# Patient Record
Sex: MALE | Race: BLACK OR AFRICAN AMERICAN | NOT HISPANIC OR LATINO | Employment: OTHER | URBAN - METROPOLITAN AREA
[De-identification: names, ages, dates, MRNs, and addresses within clinical notes are randomized per-mention and may not be internally consistent; named-entity substitution may affect disease eponyms.]

---

## 2018-03-31 ENCOUNTER — HOSPITAL ENCOUNTER (EMERGENCY)
Facility: HOSPITAL | Age: 43
Discharge: HOME | End: 2018-03-31
Attending: EMERGENCY MEDICINE
Payer: MEDICAID

## 2018-03-31 VITALS
OXYGEN SATURATION: 98 % | BODY MASS INDEX: 6.01 KG/M2 | TEMPERATURE: 100 F | RESPIRATION RATE: 20 BRPM | HEIGHT: 60 IN | WEIGHT: 30.63 LBS | HEART RATE: 135 BPM

## 2018-03-31 DIAGNOSIS — J06.9 VIRAL URI: ICD-10-CM

## 2018-03-31 DIAGNOSIS — H10.31 ACUTE BACTERIAL CONJUNCTIVITIS OF RIGHT EYE: Primary | ICD-10-CM

## 2018-03-31 LAB
FLUAV AG SPEC QL IA: NEGATIVE
FLUBV AG SPEC QL IA: NEGATIVE
RSV AG SPEC QL IA: NEGATIVE
SPECIMEN SOURCE: NORMAL
SPECIMEN SOURCE: NORMAL

## 2018-03-31 PROCEDURE — 87807 RSV ASSAY W/OPTIC: CPT

## 2018-03-31 PROCEDURE — 99283 EMERGENCY DEPT VISIT LOW MDM: CPT

## 2018-03-31 PROCEDURE — 87400 INFLUENZA A/B EACH AG IA: CPT | Mod: 59

## 2018-03-31 RX ORDER — ERYTHROMYCIN 5 MG/G
OINTMENT OPHTHALMIC
Qty: 1 TUBE | Refills: 0 | Status: SHIPPED | OUTPATIENT
Start: 2018-03-31

## 2018-04-01 NOTE — ED PROVIDER NOTES
Encounter Date: 3/31/2018    SCRIBE #1 NOTE: I, Antwan Michelle, am scribing for, and in the presence of, Jennifer Gu NP.       History     Chief Complaint   Patient presents with    Fever     Last tylenol 30 min PTA.       03/31/2018 8:20 PM     Chief complaint: Fever      Mundo Sethi is a 4 y.o. male without any significant PMHx who presents to the ED accompanied by his mother c/o a fever. His mother states he began to run a fever yesterday and has been as high as 102.5. She reports associated symptoms of runny nose and drainage from eye. She denies him having any cough, redness in his eyes, and diarrhea. She has given him tylenol and Zyrtec for his symptoms. He is UTD on his immunizations. She reports he has been drinking water normally but eating less. He has NKDA.           Review of patient's allergies indicates:  No Known Allergies  History reviewed. No pertinent past medical history.  History reviewed. No pertinent surgical history.  History reviewed. No pertinent family history.  Social History   Substance Use Topics    Smoking status: Never Smoker    Smokeless tobacco: Never Used    Alcohol use Not on file     Review of Systems   Constitutional: Positive for fever (102.5 reported, began yesterday). Negative for activity change, appetite change and chills.   HENT: Positive for rhinorrhea. Negative for congestion and sore throat.    Eyes: Positive for discharge. Negative for redness.   Respiratory: Negative for cough and wheezing.    Cardiovascular: Negative for chest pain.   Gastrointestinal: Negative for abdominal pain, diarrhea, nausea and vomiting.   Genitourinary: Negative for decreased urine volume and dysuria.   Musculoskeletal: Negative for neck pain and neck stiffness.   Skin: Negative for rash.   Neurological: Negative for seizures and headaches.       Physical Exam     Initial Vitals [03/31/18 1942]   BP Pulse Resp Temp SpO2   -- (!) 135 20 99.7 °F (37.6 °C) 98 %      MAP       --          Physical Exam    Nursing note and vitals reviewed.  Constitutional: Vital signs are normal. He appears well-developed and well-nourished. He is active and cooperative.  Non-toxic appearance. He does not have a sickly appearance.   HENT:   Head: Normocephalic.   Right Ear: Tympanic membrane, pinna and canal normal. No middle ear effusion.   Left Ear: Tympanic membrane, pinna and canal normal.  No middle ear effusion.   Nose: Rhinorrhea present.   Mouth/Throat: Mucous membranes are moist. Oropharynx is clear.   Eyes: EOM and lids are normal. Visual tracking is normal. Pupils are equal, round, and reactive to light. Right conjunctiva is not injected. Left conjunctiva is not injected. No periorbital edema, tenderness, erythema or ecchymosis on the right side. No periorbital edema, tenderness, erythema or ecchymosis on the left side.   Right eye clear drainage. Not injected. No periorbital edema or erythema.   Neck: Full passive range of motion without pain. Neck supple.   Cardiovascular: Normal rate, regular rhythm and normal heart sounds. Exam reveals no gallop and no friction rub.    No murmur heard.  Pulmonary/Chest: Effort normal and breath sounds normal. No stridor. He has no decreased breath sounds. He has no wheezes. He has no rhonchi. He has no rales.   Abdominal: Soft. Bowel sounds are normal. There is no tenderness. There is no rigidity and no rebound.   Genitourinary: Testes normal and penis normal. Circumcised.   Neurological: He is alert and oriented for age.   Skin: Skin is warm and dry. No rash noted.         ED Course   Procedures  Labs Reviewed   INFLUENZA A AND B ANTIGEN   RSV ANTIGEN DETECTION             Medical Decision Making:   History:   Old Medical Records: I decided to obtain old medical records.  Differential Diagnosis:   Viral URI  Conjunctivitis  Allergic rhinitis  sinusitis    Clinical Tests:   Lab Tests: Ordered and Reviewed       APC / Resident Notes:   Patient is a 4 y.o. male who  presents to the ED 04/01/2018 who underwent emergent evaluation for 1 day of fever and rhinorrhea and right eye drainage.  Patient is alert, laughing, and acting his age. Bi breath sounds clear without wheezing, rhonchi, tachypnea.  Mother that the patient has had a cough.  Do not think pneumonia.  Not feel patient needs chest x-ray at this time.  RSV and influenza testing are negative in ED. do not think RSV or influenza; mother also complains of drainage from right eye.  PERRLA.  No conjunctival hemorrhage or injection. Small amount of green crusting on right eye lashes.   However mother states she's been treating patient with an antibiotic eyedrop she is unsure which one as it was not recently prescribed to him.  We'll give patient proper prescription for erythromycin ointment for probable bacterial conjunctivitis. The patient appears to have a viral upper respiratory infection.  Based upon the history and physical exam the patient does not appear to have a serious bacterial infection such as pneumonia, sepsis, otitis media, bacterial sinusitis, strep pharyngitis, parapharyngeal or peritonsillar abscess, meningitis.  Patient appears very well and I have given specific return precautions to the patient and/or family members.  The patient can take over the counter medications and does not appear to need antibiotics at this time.               Scribe Attestation:   Scribe #1: I performed the above scribed service and the documentation accurately describes the services I performed. I attest to the accuracy of the note.    Attending Attestation:     Physician Attestation Statement for NP/PA:   I discussed this assessment and plan of this patient with the NP/PA, but I did not personally examine the patient. The face to face encounter was performed by the NP/PA.    Other NP/PA Attestation Additions:    History of Present Illness: 4-year-old male presented with a chief complaint of a fever.    Medical Decision Making:  Initial differential diagnosis included but not limited to viral illness, upper respiratory infection, otitis media, and conjunctivitis.  I am in agreement with the nurse practitioner's assessment, treatment, and plan of care.       Physician Attestation for Scribe:  Physician Attestation Statement for Scribe #1: I, Jennifer Gu, reviewed documentation, as scribed by in my presence, and it is both accurate and complete.     Comments: I, AMY CarrionC, personally performed the services described in this documentation. All medical record entries made by the scribe were at my direction and in my presence.  I have reviewed the chart and agree that the record reflects my personal performance and is accurate and complete. SAMUEL Carrion.  4:59 PM 04/01/2018                Clinical Impression:   The primary encounter diagnosis was Acute bacterial conjunctivitis of right eye. A diagnosis of Viral URI was also pertinent to this visit.    Disposition:   Disposition: Discharged  Condition: Stable                        Jennifer Gu NP  04/01/18 1652       Kalpesh Norris MD  04/01/18 1701

## 2018-10-24 ENCOUNTER — HOSPITAL ENCOUNTER (EMERGENCY)
Facility: HOSPITAL | Age: 43
Discharge: HOME | End: 2018-10-24
Attending: EMERGENCY MEDICINE
Payer: MEDICAID

## 2018-10-24 VITALS
TEMPERATURE: 98 F | OXYGEN SATURATION: 98 % | RESPIRATION RATE: 18 BRPM | BODY MASS INDEX: 6.63 KG/M2 | HEART RATE: 104 BPM | WEIGHT: 33.75 LBS | SYSTOLIC BLOOD PRESSURE: 102 MMHG | HEIGHT: 60 IN | DIASTOLIC BLOOD PRESSURE: 70 MMHG

## 2018-10-24 DIAGNOSIS — S01.81XA LACERATION OF FOREHEAD, INITIAL ENCOUNTER: Primary | ICD-10-CM

## 2018-10-24 DIAGNOSIS — S00.83XA CONTUSION OF FOREHEAD, INITIAL ENCOUNTER: ICD-10-CM

## 2018-10-24 PROCEDURE — 25000003 PHARM REV CODE 250: Performed by: EMERGENCY MEDICINE

## 2018-10-24 PROCEDURE — 99283 EMERGENCY DEPT VISIT LOW MDM: CPT | Mod: 25

## 2018-10-24 PROCEDURE — 12011 RPR F/E/E/N/L/M 2.5 CM/<: CPT

## 2018-10-24 RX ORDER — TRIPROLIDINE/PSEUDOEPHEDRINE 2.5MG-60MG
10 TABLET ORAL
Status: COMPLETED | OUTPATIENT
Start: 2018-10-24 | End: 2018-10-24

## 2018-10-24 RX ADMIN — IBUPROFEN 153 MG: 100 SUSPENSION ORAL at 08:10

## 2018-10-25 NOTE — ED NOTES
Wound cleaned with normal saline and wound  spray, dermabond and steri strips placed per MD tolerated well mother remained at bedside

## 2018-10-25 NOTE — ED PROVIDER NOTES
Encounter Date: 10/24/2018    SCRIBE #1 NOTE: IHolly, am scribing for, and in the presence of, Bao Brown MD.       History     Chief Complaint   Patient presents with    Laceration     patient was running and hit the corner of the wall, laceration to forehaead       Time seen by provider: 7:52 PM on 10/24/2018    Mundo Sethi is a 5 y.o. male who presents to the ED for evaluation of a laceration. About an hour pta, the patient was running around his house with his siblings when he tripped, cutting his forehead on the corner of a wall. The pt's mother denies any unusual behavior after the incident, but the cut would not stop bleeding, so she brought him in to be evaluated.   The patient's mother denies LOC or any other symptoms at this time. UTD on immunizations. No PMHx or SHx noted. NKDA.       The history is provided by the mother.     Review of patient's allergies indicates:  No Known Allergies  No past medical history on file.  No past surgical history on file.  No family history on file.  Social History     Tobacco Use    Smoking status: Never Smoker    Smokeless tobacco: Never Used   Substance Use Topics    Alcohol use: Not on file    Drug use: Not on file     Review of Systems   Constitutional: Negative for fever.   HENT: Negative for sore throat.    Respiratory: Negative for cough.    Cardiovascular: Negative for palpitations.   Gastrointestinal: Negative for nausea.   Genitourinary: Negative for difficulty urinating.   Musculoskeletal: Negative for joint swelling.   Skin: Positive for wound. Negative for rash.   Neurological: Negative for seizures.   Hematological: Does not bruise/bleed easily.       Physical Exam     Initial Vitals [10/24/18 1929]   BP Pulse Resp Temp SpO2   102/70 104 (!) 18 98.1 °F (36.7 °C) 98 %      MAP       --         Physical Exam    Nursing note and vitals reviewed.  Constitutional: He appears well-developed and well-nourished. He is active and  cooperative.  Non-toxic appearance. He does not have a sickly appearance.   HENT:   Head: Normocephalic. There are signs of injury.   Right Ear: Tympanic membrane normal.   Left Ear: Tympanic membrane normal.   Nose: Nose normal.   Mouth/Throat: Mucous membranes are moist. Oropharynx is clear.   1 cm superficial well-approximated hemostatic fresh laceration.   Eyes: Lids are normal. Visual tracking is normal.   Neck: Full passive range of motion without pain.   Cardiovascular: Normal rate, regular rhythm and normal heart sounds. Exam reveals no gallop and no friction rub.    No murmur heard.  Pulmonary/Chest: Effort normal and breath sounds normal. No stridor. He has no decreased breath sounds. He has no wheezes. He has no rhonchi. He has no rales.   Abdominal: Soft. Bowel sounds are normal. There is no tenderness. There is no rigidity and no rebound.   Neurological: He is alert and oriented for age. He has normal strength. No cranial nerve deficit or sensory deficit.   Skin: Skin is warm and dry. No rash noted.         ED Course   Lac Repair  Date/Time: 10/24/2018 8:15 PM  Performed by: Bao Brown MD  Authorized by: Bao Brown MD   Consent Done: Yes  Consent: Verbal consent obtained.  Consent given by: parent  Patient understanding: patient states understanding of the procedure being performed  Patient consent: the patient's understanding of the procedure matches consent given  Procedure consent: procedure consent matches procedure scheduled  Patient identity confirmed: , MRN, name and verbally with patient  Body area: head/neck  Location details: forehead  Laceration length: 1 cm  Foreign bodies: no foreign bodies  Tendon involvement: none  Nerve involvement: none  Vascular damage: no  Patient sedated: no  Irrigation solution: saline (cleaned with wound cleanser prior to irrigation)  Irrigation method: syringe  Amount of cleaning: standard  Debridement: none  Degree of undermining: none  Wound skin  closure material used: Steri-strips with glue overlying.  Approximation: close  Approximation difficulty: simple        Labs Reviewed - No data to display       Imaging Results    None          Medical Decision Making:   History:   Old Medical Records: I decided to obtain old medical records.  ED Management:  This patient was interviewed and examined in the presence of his mother.  At this time no evidence of underlying contusion or altered mental status, nausea/vomiting, concerning for intracranial injury or skull fracture.  This is a small laceration and the mother was educated that a scar will form, even despite the use of sutures here.  Wound was irrigated and well-approximated with Steri-Strips and skin glue.  Mother is educated about care for this wound at home and is asked to the child follow up with the pediatrician within 72 hr.  She was additionally informed about signs of progressing infection to look out for.  She is asked to return for these or any new, concerning, or worsening symptoms.  Mother is agreeable with this plan for follow-up in the child was discharged in stable condition.            Scribe Attestation:   Scribe #1: I performed the above scribed service and the documentation accurately describes the services I performed. I attest to the accuracy of the note.    I, Dr. Bao Brown, personally performed the services described in this documentation. All medical record entries made by the scribe were at my direction and in my presence.  I have reviewed the chart and agree that the record reflects my personal performance and is accurate and complete. Bao Brown MD.  5:45 AM 10/25/2018             Clinical Impression:     1. Laceration of forehead, initial encounter    2. Contusion of forehead, initial encounter        Disposition:   Disposition: Discharged  Condition: Stable                        Bao Brown MD  10/25/18 0545

## 2019-08-10 ENCOUNTER — HOSPITAL ENCOUNTER (EMERGENCY)
Facility: HOSPITAL | Age: 44
Discharge: HOME | End: 2019-08-10
Attending: EMERGENCY MEDICINE
Payer: MEDICAID

## 2019-08-10 VITALS
HEIGHT: 70 IN | WEIGHT: 199 LBS | BODY MASS INDEX: 28.49 KG/M2 | TEMPERATURE: 97 F | RESPIRATION RATE: 16 BRPM | DIASTOLIC BLOOD PRESSURE: 114 MMHG | OXYGEN SATURATION: 99 % | SYSTOLIC BLOOD PRESSURE: 175 MMHG | HEART RATE: 71 BPM

## 2019-08-10 DIAGNOSIS — F32.A DEPRESSION, UNSPECIFIED DEPRESSION TYPE: Primary | ICD-10-CM

## 2019-08-10 DIAGNOSIS — F10.10 ALCOHOL ABUSE: ICD-10-CM

## 2019-08-10 LAB
ALBUMIN SERPL BCP-MCNC: 4.7 G/DL (ref 3.5–5.2)
ALP SERPL-CCNC: 68 U/L (ref 55–135)
ALT SERPL W/O P-5'-P-CCNC: 36 U/L (ref 10–44)
AMPHET+METHAMPHET UR QL: NEGATIVE
ANION GAP SERPL CALC-SCNC: 12 MMOL/L (ref 8–16)
APAP SERPL-MCNC: <10 UG/ML (ref 10–20)
AST SERPL-CCNC: 30 U/L (ref 10–40)
BARBITURATES UR QL SCN>200 NG/ML: NORMAL
BASOPHILS # BLD AUTO: 0.16 K/UL (ref 0–0.2)
BASOPHILS NFR BLD: 2.3 % (ref 0–1.9)
BENZODIAZ UR QL SCN>200 NG/ML: NEGATIVE
BILIRUB SERPL-MCNC: 0.7 MG/DL (ref 0.1–1)
BILIRUB UR QL STRIP: NEGATIVE
BUN SERPL-MCNC: 16 MG/DL (ref 6–20)
BZE UR QL SCN: NEGATIVE
CALCIUM SERPL-MCNC: 9.1 MG/DL (ref 8.7–10.5)
CANNABINOIDS UR QL SCN: NORMAL
CHLORIDE SERPL-SCNC: 107 MMOL/L (ref 95–110)
CLARITY UR: CLEAR
CO2 SERPL-SCNC: 24 MMOL/L (ref 23–29)
COLOR UR: YELLOW
CREAT SERPL-MCNC: 1.1 MG/DL (ref 0.5–1.4)
CREAT UR-MCNC: 154 MG/DL (ref 23–375)
DIFFERENTIAL METHOD: ABNORMAL
EOSINOPHIL # BLD AUTO: 0.1 K/UL (ref 0–0.5)
EOSINOPHIL NFR BLD: 1.6 % (ref 0–8)
ERYTHROCYTE [DISTWIDTH] IN BLOOD BY AUTOMATED COUNT: 13.7 % (ref 11.5–14.5)
EST. GFR  (AFRICAN AMERICAN): >60 ML/MIN/1.73 M^2
EST. GFR  (NON AFRICAN AMERICAN): >60 ML/MIN/1.73 M^2
ETHANOL SERPL-MCNC: 168 MG/DL
ETHANOL SERPL-MCNC: 288 MG/DL
ETHANOL SERPL-MCNC: 85 MG/DL
GLUCOSE SERPL-MCNC: 111 MG/DL (ref 70–110)
GLUCOSE UR QL STRIP: NEGATIVE
HCT VFR BLD AUTO: 41.6 % (ref 40–54)
HGB BLD-MCNC: 14.3 G/DL (ref 14–18)
HGB UR QL STRIP: ABNORMAL
IMM GRANULOCYTES # BLD AUTO: 0.03 K/UL (ref 0–0.04)
IMM GRANULOCYTES NFR BLD AUTO: 0.4 % (ref 0–0.5)
KETONES UR QL STRIP: NEGATIVE
LEUKOCYTE ESTERASE UR QL STRIP: NEGATIVE
LYMPHOCYTES # BLD AUTO: 2 K/UL (ref 1–4.8)
LYMPHOCYTES NFR BLD: 28.5 % (ref 18–48)
MCH RBC QN AUTO: 31.7 PG (ref 27–31)
MCHC RBC AUTO-ENTMCNC: 34.4 G/DL (ref 32–36)
MCV RBC AUTO: 92 FL (ref 82–98)
MONOCYTES # BLD AUTO: 0.7 K/UL (ref 0.3–1)
MONOCYTES NFR BLD: 9.4 % (ref 4–15)
NEUTROPHILS # BLD AUTO: 4.1 K/UL (ref 1.8–7.7)
NEUTROPHILS NFR BLD: 57.8 % (ref 38–73)
NITRITE UR QL STRIP: NEGATIVE
NRBC BLD-RTO: 0 /100 WBC
OPIATES UR QL SCN: NEGATIVE
PCP UR QL SCN>25 NG/ML: NEGATIVE
PH UR STRIP: 6 [PH] (ref 5–8)
PLATELET # BLD AUTO: 262 K/UL (ref 150–350)
PMV BLD AUTO: 9.8 FL (ref 9.2–12.9)
POTASSIUM SERPL-SCNC: 3.4 MMOL/L (ref 3.5–5.1)
PROT SERPL-MCNC: 7.9 G/DL (ref 6–8.4)
PROT UR QL STRIP: ABNORMAL
RBC # BLD AUTO: 4.51 M/UL (ref 4.6–6.2)
SODIUM SERPL-SCNC: 143 MMOL/L (ref 136–145)
SP GR UR STRIP: 1.01 (ref 1–1.03)
TOXICOLOGY INFORMATION: NORMAL
URN SPEC COLLECT METH UR: ABNORMAL
UROBILINOGEN UR STRIP-ACNC: NEGATIVE EU/DL
WBC # BLD AUTO: 7.02 K/UL (ref 3.9–12.7)

## 2019-08-10 PROCEDURE — 80307 DRUG TEST PRSMV CHEM ANLYZR: CPT

## 2019-08-10 PROCEDURE — G0425 PR INPT TELEHEALTH CONSULT 30M: ICD-10-PCS | Mod: GT,,, | Performed by: PSYCHIATRY & NEUROLOGY

## 2019-08-10 PROCEDURE — 81003 URINALYSIS AUTO W/O SCOPE: CPT

## 2019-08-10 PROCEDURE — 80320 DRUG SCREEN QUANTALCOHOLS: CPT | Mod: 91

## 2019-08-10 PROCEDURE — 99283 EMERGENCY DEPT VISIT LOW MDM: CPT

## 2019-08-10 PROCEDURE — 25000003 PHARM REV CODE 250: Performed by: EMERGENCY MEDICINE

## 2019-08-10 PROCEDURE — 80053 COMPREHEN METABOLIC PANEL: CPT

## 2019-08-10 PROCEDURE — 80320 DRUG SCREEN QUANTALCOHOLS: CPT

## 2019-08-10 PROCEDURE — G0425 INPT/ED TELECONSULT30: HCPCS | Mod: GT,,, | Performed by: PSYCHIATRY & NEUROLOGY

## 2019-08-10 PROCEDURE — 85025 COMPLETE CBC W/AUTO DIFF WBC: CPT

## 2019-08-10 RX ORDER — POTASSIUM CHLORIDE 20 MEQ/15ML
20 SOLUTION ORAL
Status: ACTIVE | OUTPATIENT
Start: 2019-08-10 | End: 2019-08-10

## 2019-08-10 RX ORDER — AMLODIPINE BESYLATE 5 MG/1
5 TABLET ORAL
Status: COMPLETED | OUTPATIENT
Start: 2019-08-10 | End: 2019-08-10

## 2019-08-10 RX ORDER — ESCITALOPRAM OXALATE 5 MG/1
5 TABLET ORAL DAILY
Qty: 30 TABLET | Refills: 0 | Status: SHIPPED | OUTPATIENT
Start: 2019-08-10 | End: 2019-09-09

## 2019-08-10 RX ADMIN — AMLODIPINE BESYLATE 5 MG: 5 TABLET ORAL at 04:08

## 2019-08-10 NOTE — ED TRIAGE NOTES
Pt states arguing with fiance about his high blood pressure and not taking any meds, pt states had a couple beers prior to the argument.  stated that patient was running around home with shirt off, house smelt of cleaning products and there was lighter fluid bottle and lighter in bedroom.

## 2019-08-10 NOTE — CONSULTS
Interval History: no acute events overnight. Pain well controlled.     Medications:  Continuous Infusions:  Scheduled Meds:   clorazepate  7.5 mg Oral BID    escitalopram oxalate  10 mg Oral Daily    pantoprazole  40 mg Oral Daily     PRN Meds:acetaminophen, acetaminophen, calcium carbonate, ondansetron, oxyCODONE-acetaminophen, promethazine (PHENERGAN) IVPB, sodium chloride 0.9%     Review of patient's allergies indicates:   Allergen Reactions    Dilaudid [hydromorphone] Nausea And Vomiting    Morphine Nausea And Vomiting     Objective:     Vital Signs (Most Recent):  Temp: 98.2 °F (36.8 °C) (07/01/19 0359)  Pulse: 82 (07/01/19 0359)  Resp: 16 (06/30/19 2224)  BP: 100/61 (07/01/19 0359)  SpO2: (!) 93 % (07/01/19 0359) Vital Signs (24h Range):  Temp:  [98 °F (36.7 °C)-99.5 °F (37.5 °C)] 98.2 °F (36.8 °C)  Pulse:  [] 82  Resp:  [15-16] 16  SpO2:  [93 %-100 %] 93 %  BP: ()/(47-79) 100/61     Weight: 65.8 kg (145 lb)  Body mass index is 26.52 kg/m².    Intake/Output - Last 3 Shifts       06/29 0700 - 06/30 0659 06/30 0700 - 07/01 0659 07/01 0700 - 07/02 0659    P.O.  240     Total Intake(mL/kg)  240 (3.6)     Urine (mL/kg/hr)  350     Emesis/NG output  0     Other  0     Stool  0     Blood  0     Total Output  350     Net  -110            Urine Occurrence  1 x     Stool Occurrence  0 x     Emesis Occurrence  0 x           Physical Exam   Cardiovascular: Normal rate.   Pulmonary/Chest: Effort normal.   Abdominal: Soft. There is no tenderness.   Neurological: She is alert.   Skin: Skin is warm and dry.   Nursing note and vitals reviewed.      Significant Labs:  CBC:   Recent Labs   Lab 07/01/19 0436   WBC 13.17*   RBC 4.29   HGB 12.8   HCT 38.8   *   MCV 90   MCH 29.8   MCHC 33.0     CMP:   Recent Labs   Lab 07/01/19 0434   GLU 88   CALCIUM 9.8   ALBUMIN 4.0   PROT 7.2      K 4.4   CO2 26      BUN 7*   CREATININE 0.8   ALKPHOS 81   ALT 22   AST 24   BILITOT 0.8     Significant  [facility]  Psychiatry  Telepsychiatry Consult Note    Please see previous notes:    Patient agreeable to consultation via telepsychiatry.    Tele-Consultation from Psychiatry started: 8/10/2019 at 448 pm  The chief complaint leading to psychiatric consultation is: suicidal and homical threat while intoxicated  This consultation was requested by Diann Villegas the Emergency Department attending physician.  The location of the consulting psychiatrist is [facility].  The patient location is  [facility] EMERGENCY DEPARTMENT   The patient arrived at the ED at: overnight last night    Also present with the patient at the time of the consultation: tech    Patient Identification:   Mundo Sethi is a 44 y.o. male.    Patient information was obtained from patient and past medical records.  Patient presented involuntarily on transportation hold to the Emergency Department by [facility]'s office    Consults  Subjective:     History of Present Illness:  Peña patient is a 44 year old man brought in by the 's office.  He'd gotten intoxicated, argued with his wife, doused himself with light fluid and threatened to kill himself at least and potentially her.  She was scared so she called the police.  He reports that his wife was at the bedside today until just an hour ago.  He has been sobering up today.  He states that he has been neglecting himself, his health ever since Aster or maybe 2009.  ETOH heavier for 2 years since his employer closed down.  Admits frequently drinking to black out and being mean to family members.  Has stopped for 1-2 weeks multiple times.  One to 1.5 pints of cognac per day most weekend nights but not daily ever..  Denies any history of withdrawal symptoms when he has stopped in the past for 1-2 weeks.  No hx of etoh treatment.  No AA.      Does feel like he is depressed much of the time.  He remembers some of the statements he made while drunk.    He reports that he jumped in the shower  "after he doused himself last night.  He says that he wife told him she thought he doused himself to make a point / make her feel guilty about putting pressure on him.  He feels that could be true.  He doesn't feel suicidal.  He sometimes feels like     Awakens coughing out of sleep.  Feels like he is choking when he lies flat at night - like food is stuck not like he can't breath.  He doesn't have acid reflux that he knows of.  Hasn't talked to his primary care about it.      Depression:    Not sad.  disappointed in himself.  Feels down on himself.  'my life should be at a better state now'.  Increased irritability.  No SI.  No HI.  Increased isolation.      They have a 5 year old child together and each of them has 3 children from previous relationships.      Psychiatric History:   Previous Psychiatric Hospitalizations: No    Previous Medication Trials: No    Previous Suicide Attempts: no    History of Violence: no  History of Depression: yes  History of Nerissa: no  History of Auditory/Visual Hallucination no  History of Delusions: no  Outpatient psychiatrist (current & past): No    Substance Abuse History:  Tobacco:No  Alcohol: Yes  Illicit Substances:No  Detox/Rehab: No    Legal History: Past charges/incarcerations: No     Family Psychiatric History: PGF drank    Social History:  , unemployeed, blended family.      Psychiatric Mental Status Exam:  Arousal: alert  Sensorium/Orientation: oriented to grossly intact  Behavior/Cooperation: normal, cooperative   Speech: normal tone, normal rate, normal pitch, normal volume  Language: grossly intact  Mood: " i've been down on myself "   Affect: appropriate and blunted  Thought Process: normal and logical  Thought Content:   Auditory hallucinations: NO  Visual hallucinations: NO  Paranoia: NO  Delusions:  NO  Suicidal ideation: NO  Homicidal ideation: NO  Attention/Concentration:  intact  Memory:    Recent:  Intact   Remote: Intact   3/3 immediate,   /3 at 5 " Diagnostics:  I have reviewed all pertinent imaging results/findings within the past 24 hours.   min  Fund of Knowledge: Aware of current events   Abstract reasoning: proverbs were abstract  Insight: has awareness of illness  Judgment: behavior is adequate to circumstances      Past Medical History:   Past Medical History:   Diagnosis Date    Hypertension       Laboratory Data:   Labs Reviewed   CBC W/ AUTO DIFFERENTIAL - Abnormal; Notable for the following components:       Result Value    RBC 4.51 (*)     Mean Corpuscular Hemoglobin 31.7 (*)     Basophil% 2.3 (*)     All other components within normal limits   COMPREHENSIVE METABOLIC PANEL - Abnormal; Notable for the following components:    Potassium 3.4 (*)     Glucose 111 (*)     All other components within normal limits   URINALYSIS, REFLEX TO URINE CULTURE - Abnormal; Notable for the following components:    Protein, UA Trace (*)     Occult Blood UA Trace (*)     All other components within normal limits    Narrative:     Preferred Collection Type->Urine, Clean Catch  Specimen Source->Urine   ALCOHOL,MEDICAL (ETHANOL) - Abnormal; Notable for the following components:    Alcohol, Medical, Serum 288 (*)     All other components within normal limits   ALCOHOL,MEDICAL (ETHANOL) - Abnormal; Notable for the following components:    Alcohol, Medical, Serum 168 (*)     All other components within normal limits   ALCOHOL,MEDICAL (ETHANOL) - Abnormal; Notable for the following components:    Alcohol, Medical, Serum 85 (*)     All other components within normal limits   DRUG SCREEN PANEL, URINE EMERGENCY    Narrative:     Specimen Source->Urine   ACETAMINOPHEN LEVEL   BARBITURATES SCREEN, URINE       Neurological History:  Seizures: No  Head trauma: No    Allergies:    Review of patient's allergies indicates:  No Known Allergies    Medications in ER:   Medications   potassium chloride 10% oral solution 20 mEq (20 mEq Oral Not Given 8/10/19 6789)   amLODIPine tablet 5 mg (has no administration in time range)       Medications at home: none    No new subjective &  objective note has been filed under this hospital service since the last note was generated.      Assessment - Diagnosis - Goals:     Diagnosis/Impression:    Alcohol use disorder moderate.  Unspecified depressive disorder, r/o alcohol induced mood disorder and separate MDD.  Safe now.  No continuing SI.      Rec:   -lexapro 5mg x 1 week then 10mg daily  -follow up with primary care then ask primary care for psychiatry referral  -[facility] for treatment  -AA     Time with patient: 30 min      More than 50% of the time was spent counseling/coordinating care    Consulting clinician was informed of the encounter and consult note.    Consultation ended: 8/10/2019 at 521 pm    Namita Abebe MD   Psychiatry  [facility]

## 2019-08-10 NOTE — ED PROVIDER NOTES
Encounter Date: 8/10/2019       History     Chief Complaint   Patient presents with    Mental Health Problem     brought in by , called out by isaac after an argument and pouring lighter fluid on himself and stating wanting to kill himself, pt denies at this time.      44-year-old male with no diagnosed medical problems brought in by [facility] for suicidal and homicidal actions.  Patient was drinking alcohol tonight.  He and his fiancee got into an argument.  He took lighter fluid and sprayed it on himself and his fiancee and then flexed a lit cigarette at her stating that he was going to burn himself, her and the entire house down.  I spoke with the patient's fiancee, Padmaja, on the phone.  She has informed me that his behavior has been escalating for months to years.  He drinks every day and they often get into arguments.  He has told her multiple occasions that he wants to kill himself.  She is concerned for his safety and wants him to get help.  The patient denies the lighter fluid incident but he does state that he feels very overwhelmed.  He is unable to get a job because he fails drug tests and is told at physicals that he has elevated blood pressure.  He has 4 kids and he worries because he cannot take care of them.  He states that he often has feelings of wanting to kill himself.  He denies having any specific plans.  He states he feels as though he has nothing to live for and he feels hopeless.  He has never been diagnosed with any psychiatric conditions or been evaluated by a psychiatrist.        Review of patient's allergies indicates:  No Known Allergies  Past Medical History:   Diagnosis Date    Hypertension      No past surgical history on file.  No family history on file.  Social History     Tobacco Use    Smoking status: Not on file   Substance Use Topics    Alcohol use: Not on file    Drug use: Not on file     Review of Systems   Constitutional: Negative for chills, diaphoresis, fatigue  and fever.   HENT: Negative for congestion.    Eyes: Negative for visual disturbance.   Respiratory: Negative for cough, shortness of breath, wheezing and stridor.    Cardiovascular: Negative for chest pain.   Gastrointestinal: Negative for abdominal pain, diarrhea, nausea and vomiting.   Genitourinary: Negative for decreased urine volume, dysuria, flank pain and hematuria.   Musculoskeletal: Negative for back pain.   Skin: Negative for pallor.   Neurological: Negative for weakness, light-headedness, numbness and headaches.   Psychiatric/Behavioral: Positive for agitation, behavioral problems and suicidal ideas. Negative for confusion and hallucinations. The patient is nervous/anxious.        Physical Exam     Initial Vitals [08/10/19 0253]   BP Pulse Resp Temp SpO2   (!) 153/110 105 18 98.8 °F (37.1 °C) 98 %      MAP       --         Physical Exam    Nursing note and vitals reviewed.  Constitutional: He appears well-developed and well-nourished.   Agitated   HENT:   Head: Normocephalic and atraumatic.   Eyes: EOM are normal. Pupils are equal, round, and reactive to light.   Neck: Normal range of motion. Neck supple. No JVD present.   Cardiovascular: Normal rate, regular rhythm, normal heart sounds and intact distal pulses.   Pulmonary/Chest: Breath sounds normal. He has no wheezes. He has no rhonchi. He has no rales.   Abdominal: Soft. There is no tenderness. There is no rebound.   Musculoskeletal: Normal range of motion.   Neurological: He is alert and oriented to person, place, and time. No cranial nerve deficit. GCS score is 15. GCS eye subscore is 4. GCS verbal subscore is 5. GCS motor subscore is 6.   Skin: Skin is warm and dry. Capillary refill takes less than 2 seconds.   Psychiatric: His mood appears anxious. His affect is angry and labile. His speech is slurred. He is agitated. Cognition and memory are normal. He expresses impulsivity. He exhibits a depressed mood. He expresses suicidal ideation.          ED Course   Procedures  Labs Reviewed   CBC W/ AUTO DIFFERENTIAL - Abnormal; Notable for the following components:       Result Value    RBC 4.51 (*)     Mean Corpuscular Hemoglobin 31.7 (*)     Basophil% 2.3 (*)     All other components within normal limits   COMPREHENSIVE METABOLIC PANEL - Abnormal; Notable for the following components:    Potassium 3.4 (*)     Glucose 111 (*)     All other components within normal limits   URINALYSIS, REFLEX TO URINE CULTURE - Abnormal; Notable for the following components:    Protein, UA Trace (*)     Occult Blood UA Trace (*)     All other components within normal limits    Narrative:     Preferred Collection Type->Urine, Clean Catch  Specimen Source->Urine   ALCOHOL,MEDICAL (ETHANOL) - Abnormal; Notable for the following components:    Alcohol, Medical, Serum 288 (*)     All other components within normal limits   DRUG SCREEN PANEL, URINE EMERGENCY    Narrative:     Specimen Source->Urine   ACETAMINOPHEN LEVEL   BARBITURATES SCREEN, URINE          Imaging Results    None          Medical Decision Making:   ED Management:  44-year-old male presents after suicidal or homicidal action.  He is tearful and depressed on my exam.  His labs are remarkable for mild hypokalemia but otherwise grossly within normal limits.  He does have an elevated alcohol level.  He is hypertensive but does not have any symptoms of end-organ damage.  He may be hypertensive secondary to his agitation.  We will monitor him but he may need antihypertensives if his blood pressure remains elevated.  The patient is high risk and thus will PEC and seek inpatient psychiatric treatment.      Carmencita Roberts MD  Emergency Medicine  08/10/2019 4:37 AM    Patient seen and evaluated by me.  Patient evaluated by a psychiatrist.  Patient currently is not suicidal.  Psychiatrist cleared the patient for discharged and patient wants to stop drinking so will give discharge instructions with follow-up with primary  care provider.  Psychiatrist recommended starting patient on Lexapro 5 mg and will follow recommendations and discharge the patient.  Patient currently is not intoxicated and advised to follow up with [facility] as well   The patient is refusing antihypertensive medications.  He has no symptoms or laboratory markers of end-organ damage.  Medical clearance pending repeat alcohol level.  Final disposition will be transferred to Dr. Phil tidwell.      Carmencita Roberts MD  Emergency Medicine  08/10/2019 7:02 AM                   ED Course as of Aug 10 1722   Sat Aug 10, 2019   0617 I assumed care of this patient.  Awaiting repeat alcohol level.  Patient under physician emergency certificate.    [AP]   1628 Alcohol level now at 85 patient medically clear for transfer    [AP]   1640 Patient discussed with Dr. Gibson who will assume care.    [AP]      ED Course User Index  [AP] Phil Tidwell MD     Clinical Impression:       ICD-10-CM ICD-9-CM   1. Suicidal behavior with attempted self-injury T14.91XA 300.9   2. Depression, unspecified depression type F32.9 311   3. Alcohol abuse F10.10 305.00                                Nicole Gibson MD  08/10/19 1724

## 2019-08-10 NOTE — ED NOTES
Offered breakfast tray, he refused. Requested water, given. Educated him on process of repeat ETOH draws, telepsych, and PEC protocol

## 2019-08-12 LAB
BARBITURATES UR QL SCN: NEGATIVE NG/ML
LABORATORY COMMENT REPORT: NORMAL

## 2019-08-19 ENCOUNTER — HOSPITAL ENCOUNTER (OUTPATIENT)
Dept: RADIOLOGY | Facility: HOSPITAL | Age: 44
Discharge: HOME | End: 2019-08-19
Attending: NURSE PRACTITIONER
Payer: MEDICAID

## 2019-08-19 DIAGNOSIS — I10 ESSENTIAL HYPERTENSION, MALIGNANT: Primary | ICD-10-CM

## 2019-08-19 DIAGNOSIS — I10 ESSENTIAL HYPERTENSION, MALIGNANT: ICD-10-CM

## 2019-08-19 PROCEDURE — 71046 X-RAY EXAM CHEST 2 VIEWS: CPT | Mod: TC
